# Patient Record
Sex: FEMALE | Race: WHITE | NOT HISPANIC OR LATINO | Employment: OTHER | URBAN - METROPOLITAN AREA
[De-identification: names, ages, dates, MRNs, and addresses within clinical notes are randomized per-mention and may not be internally consistent; named-entity substitution may affect disease eponyms.]

---

## 2017-02-08 ENCOUNTER — OFFICE VISIT (OUTPATIENT)
Dept: NEUROLOGY | Facility: MEDICAL CENTER | Age: 32
End: 2017-02-08
Payer: MEDICAID

## 2017-02-08 VITALS
HEIGHT: 62 IN | BODY MASS INDEX: 23.19 KG/M2 | HEART RATE: 63 BPM | RESPIRATION RATE: 18 BRPM | WEIGHT: 126 LBS | TEMPERATURE: 97.5 F | SYSTOLIC BLOOD PRESSURE: 120 MMHG | OXYGEN SATURATION: 97 % | DIASTOLIC BLOOD PRESSURE: 68 MMHG

## 2017-02-08 DIAGNOSIS — G95.0 SYRINX (HCC): ICD-10-CM

## 2017-02-08 DIAGNOSIS — Q07.00 ARNOLD-CHIARI DEFORMITY (HCC): ICD-10-CM

## 2017-02-08 DIAGNOSIS — G40.909 SEIZURE CEREBRAL (HCC): ICD-10-CM

## 2017-02-08 PROCEDURE — 99204 OFFICE O/P NEW MOD 45 MIN: CPT | Performed by: PSYCHIATRY & NEUROLOGY

## 2017-02-08 RX ORDER — LEVETIRACETAM 250 MG/1
250 TABLET ORAL 2 TIMES DAILY
Qty: 60 TAB | Refills: 3 | Status: SHIPPED | OUTPATIENT
Start: 2017-02-08 | End: 2017-05-10 | Stop reason: SDUPTHER

## 2017-02-08 RX ORDER — LORATADINE 10 MG/1
10 TABLET ORAL DAILY
COMMUNITY

## 2017-02-08 RX ORDER — NORETHINDRONE ACETATE AND ETHINYL ESTRADIOL .03; 1.5 MG/1; MG/1
TABLET ORAL
COMMUNITY

## 2017-02-08 NOTE — MR AVS SNAPSHOT
"Dary Jonas   2017 10:20 AM   Office Visit   MRN: [id number]    Department:  [facility]   Dept Phone:  [phone]    Description:  Female : 1985   Provider:  Liam Owens M.D.           Reason for Visit     Establish Care sEIZURES      Allergies as of 2017     Not on File      You were diagnosed with     Seizure cerebral   [315658]       Arnold-Chiari deformity (CMS-HCC)   [208923]       Syrinx (CMS-HCC)   [466764]         Vital Signs     Blood Pressure Pulse Temperature Respirations Height Weight    120/68 mmHg 63 36.4 °C (97.5 °F) 18 1.575 m (5' 2\") 57.153 kg (126 lb)    Body Mass Index Oxygen Saturation                23.04 kg/m2 97%          Basic Information     Date Of Birth Sex Race Ethnicity Preferred Language    1985 Female White Non- English      Your appointments     May 10, 2017 11:20 AM   Follow Up Visit with Liam Owens M.D.   [facility] Neurology (--)    [address] [place] [address], [address]  [facility] [id number] [phone]           You will be receiving a confirmation call a few days before your appointment from our automated call confirmation system.              Problem List              ICD-10-CM Priority Class Noted - Resolved    Seizure cerebral I67.89   2017 - Present    Arnold-Chiari deformity (CMS-HCC) Q07.00   2017 - Present    Syrinx (CMS-HCC) G95.0   2017 - Present      Health Maintenance     Patient has no pending health maintenance at this time      Current Immunizations     No immunizations on file.      Below and/or attached are the medications your provider expects you to take. Review all of your home medications and newly ordered medications with your provider and/or pharmacist. Follow medication instructions as directed by your provider and/or pharmacist. Please keep your medication list with you and share with your provider. Update the information when medications are discontinued, doses are changed, or new " medications (including over-the-counter products) are added; and carry medication information at all times in the event of emergency situations     Allergies:  No Known Allergies          Medications  Valid as of: February 08, 2017 - 11:08 AM    Generic Name Brand Name Tablet Size Instructions for use    Cranberry (Cap) Cranberry 1000 MG Take 8,400 mg by mouth.        LevETIRAcetam (Tab) KEPPRA 250 MG Take 1 Tab by mouth 2 times a day.        Loratadine (Tab) CLARITIN 10 MG Take 10 mg by mouth every day.        Norethindrone Acet-Ethinyl Est (Tab) Norethindrone Acet-Ethinyl Est 1.5-30 MG-MCG Take  by mouth.        NS SOLN 50 mL with iron dextran complex 50 MG/ML SOLN 25 mg   27 mg by Intravenous route Once.        .                 Medicines prescribed today were sent to:     None      Medication refill instructions:       If your prescription bottle indicates you have medication refills left, it is not necessary to call your provider’s office. Please contact your pharmacy and they will refill your medication.    If your prescription bottle indicates you do not have any refills left, you may request refills at any time through one of the following ways: The online Windward system (except Urgent Care), by calling your provider’s office, or by asking your pharmacy to contact your provider’s office with a refill request. Medication refills are processed only during regular business hours and may not be available until the next business day. Your provider may request additional information or to have a follow-up visit with you prior to refilling your medication.   *Please Note: Medication refills are assigned a new Rx number when refilled electronically. Your pharmacy may indicate that no refills were authorized even though a new prescription for the same medication is available at the pharmacy. Please request the medicine by name with the pharmacy before contacting your provider for a refill.        Referral     A  referral request has been sent to our patient care coordination department. Please allow 3-5 business days for us to process this request and contact you either by phone or mail. If you do not hear from us by the 5th business day, please call us at [phone].        Instructions          IMPRESSION:    1. Spells of confusion since June 2016-- seizures?  2. Arnold-Chiari status post operation 2000 ( suboccipital craniotomy, C1 laminectomy, Duraplasty), Syrinx of cervical spine, Syringomyelia, Scolioiss  3. 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 No sensory feelings over both feet, right arm, left back noticed in the past 5 years-- probably due to syrinx      PLAN/RECOMMENDATIONS:      Will offer EEG  Will offer anti-seizure medication    SIGNATURE:  Liam Owens          MRI of brain 2016                        CloudVelocity Access Code: [email or web address]  Expires: 3/10/2017  9:58 AM    CloudVelocity  A secure, online tool to manage your health information     [employer]’s CloudVelocity® is a secure, online tool that connects you to your personalized health information from the privacy of your home -- day or night - making it very easy for you to manage your healthcare. Once the activation process is completed, you can even access your medical information using the CloudVelocity clarita, which is available for free in the Apple Clarita store or Google Play store.     CloudVelocity provides the following levels of access (as shown below):   My Chart Features   [facility] Doctor [facility]  Specialists [facility]  Urgent  Care Non-[facility]  Primary Care  Doctor   Email your healthcare team securely and privately 24/7 X X X    Manage appointments: schedule your next appointment; view details of past/upcoming appointments X      Request prescription refills. X      View recent personal medical records, including lab and immunizations X X X X   View health record, including health history, allergies, medications X X X X   Read reports about your  outpatient visits, procedures, consult and ER notes X X X X   See your discharge summary, which is a recap of your hospital and/or ER visit that includes your diagnosis, lab results, and care plan. X X       How to register for Skelta Software:  1. Go to  [email or web address].  2. Click on the Sign Up Now box, which takes you to the New Member Sign Up page. You will need to provide the following information:  a. Enter your Skelta Software Access Code exactly as it appears at the top of this page. (You will not need to use this code after you’ve completed the sign-up process. If you do not sign up before the expiration date, you must request a new code.)   b. Enter your date of birth.   c. Enter your home email address.   d. Click Submit, and follow the next screen’s instructions.  3. Create a Skelta Software ID. This will be your Skelta Software login ID and cannot be changed, so think of one that is secure and easy to remember.  4. Create a Skelta Software password. You can change your password at any time.  5. Enter your Password Reset Question and Answer. This can be used at a later time if you forget your password.   6. Enter your e-mail address. This allows you to receive e-mail notifications when new information is available in Skelta Software.  7. Click Sign Up. You can now view your health information.    For assistance activating your Skelta Software account, call [phone]

## 2017-02-08 NOTE — PROGRESS NOTES
NEUROLOGY NOTE    Referring Physician  Self      CHIEF COMPLAINT:  Came here for seizure control   Mother was in [employer]---   That made her medical care all over [facility]  Chief Complaint   Patient presents with   • Establish Care     sEIZURES       PRESENT ILLNESS:   Came here for seizure control   Mother was in [employer]---   That made her medical care all over [facility]    Her former neurosurgeon- focused on managing her headache, she started noticing spells of confusion 1-2 times per week    Her former neurosurgeon in [place]-- once in a while    Since June 2016, she started having spells of confusion,     1. Spells of confusion since June 2016-- seizures?  2. Arnold-Chiari status post operation 2000 ( suboccipital craniotomy, C1 laminectomy, Duraplasty), Syrinx of cervical spine, Syringomyelia, Scolioiss, mild right hemiparesis  3. 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    PAST SURGICAL HISTORY:  No past surgical history on file.    FAMILY HISTORY:  No family history on file.    SOCIAL HISTORY:  Social History     Social History   • Marital Status: Single     Spouse Name: N/A   • Number of Children: N/A   • Years of Education: N/A     Occupational History   • Not on file.     Social History Main Topics   • Smoking status: Not on file   • Smokeless tobacco: Not on file   • Alcohol Use: Not on file   • Drug Use: Not on file   • Sexual Activity: Not on file     Other Topics Concern   • Not on file     Social History Narrative   • No narrative on file     ALLERGIES:  Not on File  TOBHX  History   Smoking status   • Not on file   Smokeless tobacco   • Not on file     ALCHX  History   Alcohol Use: Not on file     DRUGHX  History   Drug Use Not on file           MEDICATIONS:  Current Outpatient Prescriptions   Medication   • Norethindrone Acet-Ethinyl Est (MICROGESTIN) 1.5-30 MG-MCG Tab   • loratadine (CLARITIN) 10 MG Tab   • NS SOLN 50 mL with iron dextran complex 50 MG/ML  "SOLN 25 mg   • Cranberry 1000 MG Cap     No current facility-administered medications for this visit.       REVIEW OF SYSTEM:    Constitutional: Denies fevers, Denies weight changes   Eyes: Denies changes in vision, no eye pain   Ears/Nose/Throat/Mouth: Denies nasal congestion or sore throat   Cardiovascular: Denies chest pain or palpitations   Respiratory: Denies SOB.   Gastrointestinal/Hepatic: Denies abdominal pain, nausea, vomiting, diarrhea, constipation or GI bleeding   Genitourinary: Denies bladder dysfunction, dysuria or frequency   Musculoskeletal/Rheum: Denies joint pain and swelling   Skin/Breast: Denies rash, denies breast lumps or discharge   Neurological: arnold chiari status surgery 2000, spells of seizures-- confused-- not shaking-- 1-2 times per week-- seconds to hours, scoliosis, syringomyelia  Psychiatric: ADD  Endocrine: denies hx of diabetes or thyroid dysfunction   Heme/Oncology/Lymph Nodes: Denies enlarged lymph nodes, denies brusing or known bleeding disorder   Allergic/Immunologic: Denies hx of allergies          PHYSICAL AND NEUROLOGICAL EXMAINATIONS:  VITAL SIGNS: /68 mmHg  Pulse 63  Temp(Src) 36.4 °C (97.5 °F)  Resp 18  Ht 1.575 m (5' 2\")  Wt 57.153 kg (126 lb)  BMI 23.04 kg/m2  SpO2 97%  CURRENT WEIGHT:   BMI: Body mass index is 23.04 kg/(m^2).  PREVIOUS WEIGHTS:  Wt Readings from Last 25 Encounters:   02/08/17 57.153 kg (126 lb)       General appearance of patient: WDWN(+) NAD(+)    EYES  o Fundus : Papilledem(-) Exudates(-) Hemorrhage(-)  Nervous System  Orientation to time, place and person(+)  Memory normal(-)  Language: aphasia(-)  Knowledge: past(+) Current(+)  Attention(+)  Cranial Nerves  • Nerve 2: intact  • Nerve 3,4,6: intact  • Nerve 5 : intact  • Nerve 7: intact  • Nerve 8: intact  • Nerve 9 & 10: intact  • Nerve 11: intact  • Nerve 12: intact  Muscle Power and muscle tone: mild right hemiparesis  Sensory System: no feeling over back -- left, right arm no " feeling  Reflexes: symmetric throughout  Cerebellar Function FNP normal   Gait : Steady(-) still able to walk without help  Heart and Vascular  Peripheral Vasucular system : Edema (-) Swelling(-)  RHB, Breathing sound clear  abdomen bowel sound normoactive  Extremities freely moveable  Joints no contracture       NEUROIMAGING: I reviewed the MRI/CT of brain           IMPRESSION:    1. Spells of confusion since June 2016-- seizures?  2. Arnold-Chiari status post operation 2000 ( suboccipital craniotomy, C1 laminectomy, Duraplasty), Syrinx of cervical spine, Syringomyelia, Scolioiss  3. 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 No sensory feelings over both feet, right arm, left back noticed in the past 5 years-- probably due to syrinx      PLAN/RECOMMENDATIONS:      Will offer EEG  Will offer anti-seizure medication    SIGNATURE:  Liam Owens          MRI of brain 2016

## 2017-02-08 NOTE — PATIENT INSTRUCTIONS
IMPRESSION:    1. Spells of confusion since June 2016-- seizures?  2. Arnold-Chiari status post operation 2000 ( suboccipital craniotomy, C1 laminectomy, Duraplasty), Syrinx of cervical spine, Syringomyelia, Scolioiss  3. 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 No sensory feelings over both feet, right arm, left back noticed in the past 5 years-- probably due to syrinx      PLAN/RECOMMENDATIONS:      Will offer EEG  Will offer anti-seizure medication    SIGNATURE:  Liam Owens          MRI of brain 2016

## 2017-04-26 ENCOUNTER — NON-PROVIDER VISIT (OUTPATIENT)
Dept: NEUROLOGY | Facility: MEDICAL CENTER | Age: 32
End: 2017-04-26
Payer: MEDICAID

## 2017-04-26 DIAGNOSIS — Q07.00 ARNOLD-CHIARI DEFORMITY (HCC): ICD-10-CM

## 2017-04-26 DIAGNOSIS — G40.909 SEIZURE CEREBRAL (HCC): ICD-10-CM

## 2017-04-26 DIAGNOSIS — G95.0 SYRINX (HCC): ICD-10-CM

## 2017-04-26 PROCEDURE — 95816 EEG AWAKE AND DROWSY: CPT | Performed by: PSYCHIATRY & NEUROLOGY

## 2017-04-26 NOTE — MR AVS SNAPSHOT
Dary Fergusonid   2017 10:30 AM   Non-Provider Visit   MRN: [id number]    Department:  [facility]   Dept Phone:  [phone]    Description:  Female : 1985   [place] [place] [facility]           Allergies as of 2017     Not on File      You were diagnosed with     Seizure cerebral   [644489]       Arnold-Chiari deformity (CMS-HCC)   [061132]       Syrinx (CMS-HCC)   [178909]         Basic Information     Date Of Birth Sex Race Ethnicity Preferred Language    1985 Female White Non- English      Your appointments     May 10, 2017 11:20 AM   Follow Up Visit with Liam Owens M.D.   [facility] (--)    [address] [place] [address], [address]  [facility] [address] [phone]           You will be receiving a confirmation call a few days before your appointment from our automated call confirmation system.              Problem List              ICD-10-CM Priority Class Noted - Resolved    Seizure cerebral I67.89   2017 - Present    Arnold-Chiari deformity (CMS-HCC) Q07.00   2017 - Present    Syrinx (CMS-HCC) G95.0   2017 - Present      Health Maintenance        Date Due Completion Dates    IMM DTaP/Tdap/Td Vaccine (1 - Tdap) 2006 ---    PAP SMEAR 2008 ---            Current Immunizations     No immunizations on file.      Below and/or attached are the medications your provider expects you to take. Review all of your home medications and newly ordered medications with your provider and/or pharmacist. Follow medication instructions as directed by your provider and/or pharmacist. Please keep your medication list with you and share with your provider. Update the information when medications are discontinued, doses are changed, or new medications (including over-the-counter products) are added; and carry medication information at all times in the event of emergency situations     Allergies:  No Known Allergies          Medications  Valid as  of: April 26, 2017 - 11:30 AM    Generic Name Brand Name Tablet Size Instructions for use    Cranberry (Cap) Cranberry 1000 MG Take 8,400 mg by mouth.        LevETIRAcetam (Tab) KEPPRA 250 MG Take 1 Tab by mouth 2 times a day.        Loratadine (Tab) CLARITIN 10 MG Take 10 mg by mouth every day.        Norethindrone Acet-Ethinyl Est (Tab) Norethindrone Acet-Ethinyl Est 1.5-30 MG-MCG Take  by mouth.        NS SOLN 50 mL with iron dextran complex 50 MG/ML SOLN 25 mg   27 mg by Intravenous route Once.        .                 Medicines prescribed today were sent to:     None      Medication refill instructions:       If your prescription bottle indicates you have medication refills left, it is not necessary to call your provider’s office. Please contact your pharmacy and they will refill your medication.    If your prescription bottle indicates you do not have any refills left, you may request refills at any time through one of the following ways: The online C3 Jian system (except Urgent Care), by calling your provider’s office, or by asking your pharmacy to contact your provider’s office with a refill request. Medication refills are processed only during regular business hours and may not be available until the next business day. Your provider may request additional information or to have a follow-up visit with you prior to refilling your medication.   *Please Note: Medication refills are assigned a new Rx number when refilled electronically. Your pharmacy may indicate that no refills were authorized even though a new prescription for the same medication is available at the pharmacy. Please request the medicine by name with the pharmacy before contacting your provider for a refill.           C3 Jian Access Code: [email or web address]  Expires: 5/26/2017 11:30 AM    C3 Jian  A secure, online tool to manage your health information     [employer]’s C3 Jian® is a secure, online tool that connects you to your personalized  health information from the privacy of your home -- day or night - making it very easy for you to manage your healthcare. Once the activation process is completed, you can even access your medical information using the MobiApps clarita, which is available for free in the Apple Clarita store or Google Play store.     MobiApps provides the following levels of access (as shown below):   My Chart Features   [facility] Doctor [facility]  Specialists [facility]  Urgent  Care [facility]  Primary Care  Doctor   Email your healthcare team securely and privately 24/7 X X X    Manage appointments: schedule your next appointment; view details of past/upcoming appointments X      Request prescription refills. X      View recent personal medical records, including lab and immunizations X X X X   View health record, including health history, allergies, medications X X X X   Read reports about your outpatient visits, procedures, consult and ER notes X X X X   See your discharge summary, which is a recap of your hospital and/or ER visit that includes your diagnosis, lab results, and care plan. X X       How to register for MobiApps:  1. Go to  [email or web address].  2. Click on the Sign Up Now box, which takes you to the New Member Sign Up page. You will need to provide the following information:  a. Enter your MobiApps Access Code exactly as it appears at the top of this page. (You will not need to use this code after you’ve completed the sign-up process. If you do not sign up before the expiration date, you must request a new code.)   b. Enter your date of birth.   c. Enter your home email address.   d. Click Submit, and follow the next screen’s instructions.  3. Create a MobiApps ID. This will be your MobiApps login ID and cannot be changed, so think of one that is secure and easy to remember.  4. Create a MobiApps password. You can change your password at any time.  5. Enter your Password Reset Question and Answer. This can be used at a  later time if you forget your password.   6. Enter your e-mail address. This allows you to receive e-mail notifications when new information is available in EventRadar.  7. Click Sign Up. You can now view your health information.    For assistance activating your EventRadar account, call [phone]

## 2017-05-01 NOTE — PROCEDURES
DATE OF SERVICE:  04/26/2017    ROUTINE ELECTROENCEPHALOGRAM REPORT        INDICATION:     A 31-year-old patient.  The  patient had spells of confusion since 06/20/2016,   history of Arnold-Chiari deformity    TECHNIQUE: 30 channel routine electroencephalogram (EEG) was performed in accordance with the international 10-20 system. The study was reviewed in bipolar and referential montages. The recording examined the patient during wakeful and drowsy state(s).     DESCRIPTION OF THE RECORD:      The EEG background consists of posterior dominant alpha rhythm 10-11 Hz, alpha   rhythm, normal reactivity to eye opening.  There are no epileptiform   discharges.  There are no focal delta slowing.  Stage I sleep was obtained.    Activation procedure did not produce any new abnormality.    ACTIVATION PROCEDURES:      Hyperventilation and Photic Stimulation were done    ICTAL AND/OR INTERICTAL FINDINGS:    No focal or generalized epileptiform activity noted. No regional slowing was seen during this routine study.  No clinical events or seizures were reported or recorded during the study.      EKG: sampling of the EKG recording demonstrated sinus rhythm.        INTERPRETATION:      INTERPRETATION:  This EEG is still not remarkable.  Clinical correlation may   help.    Of note, unremarkable EEG does not completely exclude the diagnosis  of seizures since seizure is an episodic phenomena.  Clinical correlation may help     If clinical suspicion of seizure remains high.  Prolonged outpatient EEG   monitoring may be of help.         ____________________________________     MD XAVI GUEVARA / SOPHIA    DD:  04/30/2017 18:46:25  DT:  04/30/2017 18:57:38    D#:  [id number]  Job#:  [id number]

## 2017-05-10 ENCOUNTER — OFFICE VISIT (OUTPATIENT)
Dept: NEUROLOGY | Facility: MEDICAL CENTER | Age: 32
End: 2017-05-10
Payer: MEDICAID

## 2017-05-10 VITALS
WEIGHT: 230 LBS | OXYGEN SATURATION: 96 % | SYSTOLIC BLOOD PRESSURE: 120 MMHG | TEMPERATURE: 99 F | DIASTOLIC BLOOD PRESSURE: 82 MMHG | HEART RATE: 99 BPM | HEIGHT: 62 IN | BODY MASS INDEX: 42.33 KG/M2 | RESPIRATION RATE: 16 BRPM

## 2017-05-10 DIAGNOSIS — Q07.00 ARNOLD-CHIARI DEFORMITY (HCC): ICD-10-CM

## 2017-05-10 DIAGNOSIS — G95.0 SYRINX (HCC): ICD-10-CM

## 2017-05-10 DIAGNOSIS — G40.909 SEIZURE CEREBRAL (HCC): ICD-10-CM

## 2017-05-10 PROCEDURE — 99214 OFFICE O/P EST MOD 30 MIN: CPT | Performed by: PSYCHIATRY & NEUROLOGY

## 2017-05-10 RX ORDER — CALCIUM CARBONATE 500 MG/1
500 TABLET, CHEWABLE ORAL DAILY
COMMUNITY

## 2017-05-10 RX ORDER — NAPROXEN SODIUM 220 MG
220 TABLET ORAL 2 TIMES DAILY WITH MEALS
COMMUNITY

## 2017-05-10 RX ORDER — LEVETIRACETAM 250 MG/1
250 TABLET ORAL 2 TIMES DAILY
Qty: 180 TAB | Refills: 1 | Status: SHIPPED | OUTPATIENT
Start: 2017-05-10 | End: 2017-10-25 | Stop reason: SDUPTHER

## 2017-05-10 ASSESSMENT — PATIENT HEALTH QUESTIONNAIRE - PHQ9: CLINICAL INTERPRETATION OF PHQ2 SCORE: 0

## 2017-05-10 NOTE — PROGRESS NOTES
NEUROLOGY NOTE    Referring Physician  Self      CHIEF COMPLAINT:  Came here for seizure control   Mother was in [employer]---   That made her medical care all over [facility]  Chief Complaint   Patient presents with   • Follow-Up     Seizure cerebral       PRESENT ILLNESS:   Came here for seizure control   Mother was in [employer]---   That made her medical care all over [facility]    Her former neurosurgeon- focused on managing her headache, she started noticing spells of confusion 1-2 times per week    Her former neurosurgeon in [place]-- once in a while    Since June 2016, she started having spells of confusion,     1. Spells of confusion since June 2016-- seizures?  2. Arnold-Chiari status post operation 2000 ( suboccipital craniotomy, C1 laminectomy, Duraplasty), Syrinx of cervical spine, Syringomyelia, Scolioiss, mild right hemiparesis  3. 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 No pertinent past medical history.    PAST SURGICAL HISTORY:  History reviewed. No pertinent past surgical history.    FAMILY HISTORY:  History reviewed. No pertinent family history.    SOCIAL HISTORY:  Social History     Social History   • Marital Status: Single     Spouse Name: N/A   • Number of Children: N/A   • Years of Education: N/A     Occupational History   • Not on file.     Social History Main Topics   • Smoking status: Former Smoker   • Smokeless tobacco: Not on file   • Alcohol Use: Not on file   • Drug Use: Not on file   • Sexual Activity: Not on file     Other Topics Concern   • Not on file     Social History Narrative   • No narrative on file     ALLERGIES:  Allergies   Allergen Reactions   • Motrin [Ibuprofen] Nausea     Patient reports that medication upsets her stomach, even if she had taken it with food.     TOBHX  History   Smoking status   • Former Smoker   Smokeless tobacco   • Not on file     ALCHX  History   Alcohol Use: Not on file     DRUGHX  History   Drug Use Not on file            "          MEDICATIONS:  Current Outpatient Prescriptions   Medication   • naproxen (ANAPROX) 220 MG tablet   • LOPERAMIDE HCL PO   • calcium carbonate (TUMS) 500 MG Chew Tab   • Norethindrone Acet-Ethinyl Est (MICROGESTIN) 1.5-30 MG-MCG Tab   • loratadine (CLARITIN) 10 MG Tab   • Cranberry 1000 MG Cap   • levetiracetam (KEPPRA) 250 MG tablet   • NS SOLN 50 mL with iron dextran complex 50 MG/ML SOLN 25 mg     No current facility-administered medications for this visit.       REVIEW OF SYSTEM:    Constitutional: Denies fevers, Denies weight changes   Eyes: Denies changes in vision, no eye pain   Ears/Nose/Throat/Mouth: Denies nasal congestion or sore throat   Cardiovascular: Denies chest pain or palpitations   Respiratory: Denies SOB.   Gastrointestinal/Hepatic: Denies abdominal pain, nausea, vomiting, diarrhea, constipation or GI bleeding   Genitourinary: Denies bladder dysfunction, dysuria or frequency   Musculoskeletal/Rheum: Denies joint pain and swelling   Skin/Breast: Denies rash, denies breast lumps or discharge   Neurological: arnold chiari status surgery 2000, spells of seizures-- confused-- not shaking-- 1-2 times per week-- seconds to hours, scoliosis, syringomyelia  Psychiatric: ADD  Endocrine: denies hx of diabetes or thyroid dysfunction   Heme/Oncology/Lymph Nodes: Denies enlarged lymph nodes, denies brusing or known bleeding disorder   Allergic/Immunologic: Denies hx of allergies          PHYSICAL AND NEUROLOGICAL EXMAINATIONS:  VITAL SIGNS: /82 mmHg  Pulse 99  Temp(Src) 37.2 °C (99 °F)  Resp 16  Ht 1.575 m (5' 2\")  Wt 104.327 kg (230 lb)  BMI 42.06 kg/m2  SpO2 96%  CURRENT WEIGHT:   BMI: Body mass index is 42.06 kg/(m^2).  PREVIOUS WEIGHTS:  Wt Readings from Last 25 Encounters:   05/10/17 104.327 kg (230 lb)   02/08/17 57.153 kg (126 lb)       General appearance of patient: WDWN(+) NAD(+)    EYES  o Fundus : Papilledem(-) Exudates(-) Hemorrhage(-)  Nervous System  Orientation to time, " place and person(+)  Memory normal(-)  Language: aphasia(-)  Knowledge: past(+) Current(+)  Attention(+)  Cranial Nerves  • Nerve 2: intact  • Nerve 3,4,6: intact  • Nerve 5 : intact  • Nerve 7: intact  • Nerve 8: intact  • Nerve 9 & 10: intact  • Nerve 11: intact  • Nerve 12: intact  Muscle Power and muscle tone: mild right hemiparesis  Sensory System: no feeling over back -- left, right arm no feeling  Reflexes: symmetric throughout  Cerebellar Function FNP normal   Gait : Steady(-) still able to walk without help  Heart and Vascular  Peripheral Vasucular system : Edema (-) Swelling(-)  RHB, Breathing sound clear  abdomen bowel sound normoactive  Extremities freely moveable  Joints no contracture       NEUROIMAGING: I reviewed the MRI/CT of brain           IMPRESSION:    1. Spells of confusion since June 2016-- seizures?-- no more spells with Keppra 250mg bid  2. Arnold-Chiari status post operation 2000 ( suboccipital craniotomy, C1 laminectomy, Duraplasty), Syrinx of cervical spine, Syringomyelia, Scolioiss  3. 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 No sensory feelings over both feet, right arm, left back noticed in the past 5 years-- probably due to syrinx      PLAN/RECOMMENDATIONS:    Continue keppra 250mg two times per day    It is reasonable to do the followings  ________________________________________________________________________    Advise exercise muscle and brain  Avoid processed foods-- focus on natural foods  Avoid sugar  Weight Loss  ________________________________________________________________________    Fish Oil -- Omega 3 1000mg 3# daily  Try Daily Probiotic too  Vit D-3 2000 unit daily  ________________________________________________________________________    ________________________________________________________________________          SIGNATURE:  Liam Owens    INTERPRETATION:  This EEG is still not remarkable.  Clinical correlation may    help.    Of note, unremarkable EEG does  not completely exclude the diagnosis  of seizures since seizure is an episodic phenomena.  Clinical correlation may help      If clinical suspicion of seizure remains high.  Prolonged outpatient EEG    monitoring may be of help.         ____________________________________     MD XAVI GUEVARA / SOPHIA    DD:  04/30/2017 18:46:25  DT:  04/30/2017 18:57:38    D#:  [id number]  Job#:  [id number]                  Links      Previous Version           MRI of brain 2016

## 2017-05-10 NOTE — MR AVS SNAPSHOT
"        Dary Fergusonid   5/10/2017 11:20 AM   Office Visit   MRN: [id number]    Department:  [facility]   Dept Phone:  [phone]    Description:  Female : 1985   Provider:  Liam Owens M.D.           Reason for Visit     Follow-Up Seizure cerebral      Allergies as of 5/10/2017     Allergen Noted Reactions    Motrin [Ibuprofen] 05/10/2017   Nausea    Patient reports that medication upsets her stomach, even if she had taken it with food.      You were diagnosed with     Seizure cerebral   [661280]       Arnold-Chiari deformity (CMS-HCC)   [534943]       Syrinx (CMS-HCC)   [891354]         Vital Signs     Blood Pressure Pulse Temperature Respirations Height Weight    120/82 mmHg 99 37.2 °C (99 °F) 16 1.575 m (5' 2\") 104.327 kg (230 lb)    Body Mass Index Oxygen Saturation Smoking Status             42.06 kg/m2 96% Former Smoker         Basic Information     Date Of Birth Sex Race Ethnicity Preferred Language    1985 Female White Non- English      Your appointments     Sep 11, 2017 10:20 AM   Follow Up Visit with Liam Owens M.D.   [facility] (--)    [address] [place] [address], [address]  [facility] [address] [phone]           You will be receiving a confirmation call a few days before your appointment from our automated call confirmation system.              Problem List              ICD-10-CM Priority Class Noted - Resolved    Seizure cerebral I67.89   2017 - Present    Arnold-Chiari deformity (CMS-HCC) Q07.00   2017 - Present    Syrinx (CMS-HCC) G95.0   2017 - Present      Health Maintenance        Date Due Completion Dates    IMM DTaP/Tdap/Td Vaccine (1 - Tdap) 2006 ---    PAP SMEAR 2008 ---            Current Immunizations     No immunizations on file.      Below and/or attached are the medications your provider expects you to take. Review all of your home medications and newly ordered medications with your provider and/or pharmacist. Follow " medication instructions as directed by your provider and/or pharmacist. Please keep your medication list with you and share with your provider. Update the information when medications are discontinued, doses are changed, or new medications (including over-the-counter products) are added; and carry medication information at all times in the event of emergency situations     Allergies:  MOTRIN - Nausea               Medications  Valid as of: May 10, 2017 - 11:40 AM    Generic Name Brand Name Tablet Size Instructions for use    Calcium Carbonate Antacid (Chew Tab) TUMS 500 MG Take 500 mg by mouth every day.        Cranberry (Cap) Cranberry 1000 MG Take 8,400 mg by mouth.        LevETIRAcetam (Tab) KEPPRA 250 MG Take 1 Tab by mouth 2 times a day.        Loperamide HCl   Take  by mouth.        Loratadine (Tab) CLARITIN 10 MG Take 10 mg by mouth every day.        Naproxen Sodium (Tab) ANAPROX 220 MG Take 220 mg by mouth 2 times a day, with meals.        Norethindrone Acet-Ethinyl Est (Tab) Norethindrone Acet-Ethinyl Est 1.5-30 MG-MCG Take  by mouth.        NS SOLN 50 mL with iron dextran complex 50 MG/ML SOLN 25 mg   27 mg by Intravenous route Once.        .                 Medicines prescribed today were sent to:     [facility] [address] [place] [id number]    Phone: [phone] Fax: [phone]    Open 24 Hours?: No      Medication refill instructions:       If your prescription bottle indicates you have medication refills left, it is not necessary to call your provider’s office. Please contact your pharmacy and they will refill your medication.    If your prescription bottle indicates you do not have any refills left, you may request refills at any time through one of the following ways: The online Frequent Browser system (except Urgent Care), by calling your provider’s office, or by asking your pharmacy to contact your provider’s office with a refill request. Medication  refills are processed only during regular business hours and may not be available until the next business day. Your provider may request additional information or to have a follow-up visit with you prior to refilling your medication.   *Please Note: Medication refills are assigned a new Rx number when refilled electronically. Your pharmacy may indicate that no refills were authorized even though a new prescription for the same medication is available at the pharmacy. Please request the medicine by name with the pharmacy before contacting your provider for a refill.        Instructions        IMPRESSION:    1. Spells of confusion since June 2016-- seizures?-- no more spells with Keppra 250mg bid  2. Arnold-Chiari status post operation 2000 ( suboccipital craniotomy, C1 laminectomy, Duraplasty), Syrinx of cervical spine, Syringomyelia, Scolioiss  3. 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 No sensory feelings over both feet, right arm, left back noticed in the past 5 years-- probably due to syrinx      PLAN/RECOMMENDATIONS:    Continue keppra 250mg two times per day    It is reasonable to do the followings  ________________________________________________________________________    Advise exercise muscle and brain  Avoid processed foods-- focus on natural foods  Avoid sugar  Weight Loss  ________________________________________________________________________    Fish Oil -- Omega 3 1000mg 3# daily  Try Daily Probiotic too  Vit D-3 2000 unit daily  ________________________________________________________________________    ________________________________________________________________________              [facility] Access Code: [email or web address]  Expires: 5/26/2017 11:30 AM    [employer]  A secure, online tool to manage your health information     [employer]® is a secure, online tool that connects you to your personalized health information from the privacy of your home -- day or night - making it very  easy for you to manage your healthcare. Once the activation process is completed, you can even access your medical information using the Neumitra clarita, which is available for free in the Apple Calrita store or Google Play store.     Neumitra provides the following levels of access (as shown below):   My Chart Features   [facility] Doctor [facility]  Specialists [facility]  Urgent  Care Non-[facility]  Primary Care  Doctor   Email your healthcare team securely and privately 24/7 X X X    Manage appointments: schedule your next appointment; view details of past/upcoming appointments X      Request prescription refills. X      View recent personal medical records, including lab and immunizations X X X X   View health record, including health history, allergies, medications X X X X   Read reports about your outpatient visits, procedures, consult and ER notes X X X X   See your discharge summary, which is a recap of your hospital and/or ER visit that includes your diagnosis, lab results, and care plan. X X       How to register for Neumitra:  1. Go to  [email or web address].  2. Click on the Sign Up Now box, which takes you to the New Member Sign Up page. You will need to provide the following information:  a. Enter your Neumitra Access Code exactly as it appears at the top of this page. (You will not need to use this code after you’ve completed the sign-up process. If you do not sign up before the expiration date, you must request a new code.)   b. Enter your date of birth.   c. Enter your home email address.   d. Click Submit, and follow the next screen’s instructions.  3. Create a Neumitra ID. This will be your Neumitra login ID and cannot be changed, so think of one that is secure and easy to remember.  4. Create a Neumitra password. You can change your password at any time.  5. Enter your Password Reset Question and Answer. This can be used at a later time if you forget your password.   6. Enter your e-mail address. This  allows you to receive e-mail notifications when new information is available in [employer].  7. Click Sign Up. You can now view your health information.    For assistance activating your [employer] account, call [phone]

## 2017-05-10 NOTE — PATIENT INSTRUCTIONS
IMPRESSION:    1. Spells of confusion since June 2016-- seizures?-- no more spells with Keppra 250mg bid  2. Arnold-Chiari status post operation 2000 ( suboccipital craniotomy, C1 laminectomy, Duraplasty), Syrinx of cervical spine, Syringomyelia, Scolioiss  3. 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 No sensory feelings over both feet, right arm, left back noticed in the past 5 years-- probably due to syrinx      PLAN/RECOMMENDATIONS:    Continue keppra 250mg two times per day    It is reasonable to do the followings  ________________________________________________________________________    Advise exercise muscle and brain  Avoid processed foods-- focus on natural foods  Avoid sugar  Weight Loss  ________________________________________________________________________    Fish Oil -- Omega 3 1000mg 3# daily  Try Daily Probiotic too  Vit D-3 2000 unit daily  ________________________________________________________________________    ________________________________________________________________________

## 2017-10-25 ENCOUNTER — OFFICE VISIT (OUTPATIENT)
Dept: NEUROLOGY | Facility: MEDICAL CENTER | Age: 32
End: 2017-10-25
Payer: MEDICAID

## 2017-10-25 VITALS
HEIGHT: 62 IN | SYSTOLIC BLOOD PRESSURE: 118 MMHG | BODY MASS INDEX: 42.21 KG/M2 | TEMPERATURE: 97.9 F | RESPIRATION RATE: 16 BRPM | HEART RATE: 89 BPM | DIASTOLIC BLOOD PRESSURE: 74 MMHG | OXYGEN SATURATION: 97 % | WEIGHT: 229.4 LBS

## 2017-10-25 DIAGNOSIS — G95.0 SYRINX (HCC): ICD-10-CM

## 2017-10-25 DIAGNOSIS — G40.909 SEIZURE CEREBRAL (HCC): ICD-10-CM

## 2017-10-25 DIAGNOSIS — Q07.00 ARNOLD-CHIARI DEFORMITY (HCC): ICD-10-CM

## 2017-10-25 PROCEDURE — 99214 OFFICE O/P EST MOD 30 MIN: CPT | Performed by: PSYCHIATRY & NEUROLOGY

## 2017-10-25 RX ORDER — LEVETIRACETAM 250 MG/1
250 TABLET ORAL 2 TIMES DAILY
Qty: 180 TAB | Refills: 3 | Status: SHIPPED | OUTPATIENT
Start: 2017-10-25

## 2017-10-25 RX ORDER — FERROUS GLUCONATE 324(38)MG
27 TABLET ORAL
COMMUNITY

## 2017-10-25 NOTE — PATIENT INSTRUCTIONS
IMPRESSION:    1. Spells of confusion since June 2016-- seizures?-- no more spells with Keppra 250mg bid  2. Arnold-Chiari status post operation 2000 ( suboccipital craniotomy, C1 laminectomy, Duraplasty), Syrinx of cervical spine, Syringomyelia, Scolioiss  3. Delayed mile stones -- motor and speech delay  4. No sensory feelings over both feet, right arm, left back noticed -- probably due to syrinx ( since 2014)      PLAN/RECOMMENDATIONS:    Denied depression     The sensory problems in the limbs remain the same--- at times, felt limbs go to sleep    Developed short term nasal discharge half an hour of keppra in the night dose    Continue keppra 250mg two times per day    It is reasonable to do the followings  ________________________________________________________________________    Advise exercise muscle and brain  Avoid processed foods-- focus on natural foods  Avoid sugar  Weight Loss  ________________________________________________________________________    Fish Oil -- Omega 3 1000mg 3# daily  Try Daily Probiotic too  Vit D-3 4000 unit daily  ________________________________________________________________________    ________________________________________________________________________      Patient and patient's family are going to relocate to other state soon    SIGNATURE:  Liam Owens    INTERPRETATION:  This EEG is still not remarkable.  Clinical correlation may    help.    Of note, unremarkable EEG does not completely exclude the diagnosis  of seizures since seizure is an episodic phenomena.  Clinical correlation may help      If clinical suspicion of seizure remains high.  Prolonged outpatient EEG    monitoring may be of help.         ____________________________________     MD XAVI GUEVARA / SOPHIA    DD:  04/30/2017 18:46:25  DT:  04/30/2017 18:57:38    D#:  [id number]  Job#:  [id number]                  Links      Previous Version           MRI of brain 2016

## 2017-10-25 NOTE — PROGRESS NOTES
NEUROLOGY NOTE    Referring Physician  Self      CHIEF COMPLAINT:  Came here for seizure control   Mother was in [employer]---   That made her medical care all over [facility]  Chief Complaint   Patient presents with   • Seizure     4 month follow up       PRESENT ILLNESS:   Came here for seizure control   Mother was in [employer]---   That made her medical care all over [facility]    Her former neurosurgeon- focused on managing her headache, she started noticing spells of confusion 1-2 times per week    Her former neurosurgeon in [place]-- once in a while    Since June 2016, she started having spells of confusion,     1. Spells of confusion since June 2016-- seizures?  2. Arnold-Chiari status post operation 2000 ( suboccipital craniotomy, C1 laminectomy, Duraplasty), Syrinx of cervical spine, Syringomyelia, Scolioiss, mild right hemiparesis  3. 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    PAST SURGICAL HISTORY:  No past surgical history on file.    FAMILY HISTORY:  No family history on file.    SOCIAL HISTORY:  Social History     Social History   • Marital status: Single     Spouse name: N/A   • Number of children: N/A   • Years of education: N/A     Occupational History   • Not on file.     Social History Main Topics   • Smoking status: Former Smoker   • Smokeless tobacco: Not on file   • Alcohol use Not on file   • Drug use: Unknown   • Sexual activity: Not on file     Other Topics Concern   • Not on file     Social History Narrative   • No narrative on file     ALLERGIES:  Allergies   Allergen Reactions   • Motrin [Ibuprofen] Nausea     Patient reports that medication upsets her stomach, even if she had taken it with food.     TOBHX  History   Smoking Status   • Former Smoker   Smokeless Tobacco   • Not on file     ALCHX  History   Alcohol use Not on file     DRUGHX  History   Drug use: Unknown           MEDICATIONS:  Current Outpatient Prescriptions   Medication   • ferrous  "gluconate (FERGON) 324 (38 Fe) MG Tab   • levetiracetam (KEPPRA) 250 MG tablet   • Norethindrone Acet-Ethinyl Est (MICROGESTIN) 1.5-30 MG-MCG Tab   • loratadine (CLARITIN) 10 MG Tab   • Cranberry 1000 MG Cap   • naproxen (ANAPROX) 220 MG tablet   • LOPERAMIDE HCL PO   • calcium carbonate (TUMS) 500 MG Chew Tab   • NS SOLN 50 mL with iron dextran complex 50 MG/ML SOLN 25 mg     No current facility-administered medications for this visit.        REVIEW OF SYSTEM:    Constitutional: Denies fevers, Denies weight changes   Eyes: Denies changes in vision, no eye pain   Ears/Nose/Throat/Mouth: Denies nasal congestion or sore throat   Cardiovascular: Denies chest pain or palpitations   Respiratory: Denies SOB.   Gastrointestinal/Hepatic: Denies abdominal pain, nausea, vomiting, diarrhea, constipation or GI bleeding   Genitourinary: Denies bladder dysfunction, dysuria or frequency   Musculoskeletal/Rheum: Denies joint pain and swelling   Skin/Breast: Denies rash, denies breast lumps or discharge   Neurological: arnold chiari status surgery 2000, spells of seizures-- confused-- not shaking-- 1-2 times per week-- seconds to hours, scoliosis, syringomyelia  Psychiatric: ADD  Endocrine: denies hx of diabetes or thyroid dysfunction   Heme/Oncology/Lymph Nodes: Denies enlarged lymph nodes, denies brusing or known bleeding disorder   Allergic/Immunologic: Denies hx of allergies          PHYSICAL AND NEUROLOGICAL EXMAINATIONS:  VITAL SIGNS: /74   Pulse 89   Temp 36.6 °C (97.9 °F)   Resp 16   Ht 1.575 m (5' 2\")   Wt 104.1 kg (229 lb 6.4 oz)   SpO2 97%   BMI 41.96 kg/m²   CURRENT WEIGHT:   BMI: Body mass index is 41.96 kg/m².  PREVIOUS WEIGHTS:  Wt Readings from Last 25 Encounters:   10/25/17 104.1 kg (229 lb 6.4 oz)   05/10/17 104.3 kg (230 lb)   02/08/17 57.2 kg (126 lb)       General appearance of patient: WDWN(+) NAD(+)    EYES  o Fundus : Papilledem(-) Exudates(-) Hemorrhage(-)  Nervous System  Orientation to time, " place and person(+)  Memory normal(-)  Language: aphasia(-)  Knowledge: past(+) Current(+)  Attention(+)  Cranial Nerves  • Nerve 2: intact  • Nerve 3,4,6: intact  • Nerve 5 : intact  • Nerve 7: intact  • Nerve 8: intact  • Nerve 9 & 10: intact  • Nerve 11: intact  • Nerve 12: intact  Muscle Power and muscle tone: mild right hemiparesis  Sensory System: no feeling over back -- left, right arm no feeling  Reflexes: symmetric throughout  Cerebellar Function FNP normal   Gait : Steady(-) still able to walk without help  Heart and Vascular  Peripheral Vasucular system : Edema (-) Swelling(-)  RHB, Breathing sound clear  abdomen bowel sound normoactive  Extremities freely moveable  Joints no contracture       NEUROIMAGING: I reviewed the MRI/CT of brain           IMPRESSION:    1. Spells of confusion since June 2016-- seizures?-- no more spells with Keppra 250mg bid  2. Arnold-Chiari status post operation 2000 ( suboccipital craniotomy, C1 laminectomy, Duraplasty), Syrinx of cervical spine, Syringomyelia, Scolioiss  3. Delayed mile stones -- motor and speech delay  4. No sensory feelings over both feet, right arm, left back noticed -- probably due to syrinx ( since 2014)      PLAN/RECOMMENDATIONS:    Denied depression     The sensory problems in the limbs remain the same--- at times, felt limbs go to sleep    Developed short term nasal discharge half an hour of keppra in the night dose    Continue keppra 250mg two times per day    It is reasonable to do the followings  ________________________________________________________________________    Advise exercise muscle and brain  Avoid processed foods-- focus on natural foods  Avoid sugar  Weight Loss  ________________________________________________________________________    Fish Oil -- Omega 3 1000mg 3# daily  Try Daily Probiotic too  Vit D-3 4000 unit  daily  ________________________________________________________________________    ________________________________________________________________________          SIGNATURE:  Liam Owens    INTERPRETATION:  This EEG is still not remarkable.  Clinical correlation may    help.    Of note, unremarkable EEG does not completely exclude the diagnosis  of seizures since seizure is an episodic phenomena.  Clinical correlation may help      If clinical suspicion of seizure remains high.  Prolonged outpatient EEG    monitoring may be of help.         ____________________________________     MD XAVI GUEVARA / SOPHIA    DD:  04/30/2017 18:46:25  DT:  04/30/2017 18:57:38    D#:  [id number]  Job#:  [id number]                  Links      Previous Version           MRI of brain 2016